# Patient Record
Sex: FEMALE | URBAN - METROPOLITAN AREA
[De-identification: names, ages, dates, MRNs, and addresses within clinical notes are randomized per-mention and may not be internally consistent; named-entity substitution may affect disease eponyms.]

---

## 2017-06-04 ENCOUNTER — EMERGENCY (EMERGENCY)
Facility: HOSPITAL | Age: 57
LOS: 1 days | Discharge: HOME | End: 2017-06-04
Attending: EMERGENCY MEDICINE | Admitting: EMERGENCY MEDICINE
Payer: COMMERCIAL

## 2017-06-04 VITALS
TEMPERATURE: 99 F | SYSTOLIC BLOOD PRESSURE: 134 MMHG | OXYGEN SATURATION: 97 % | HEART RATE: 78 BPM | RESPIRATION RATE: 16 BRPM | DIASTOLIC BLOOD PRESSURE: 82 MMHG

## 2017-06-04 DIAGNOSIS — Z23 ENCOUNTER FOR IMMUNIZATION: ICD-10-CM

## 2017-06-04 DIAGNOSIS — Z79.82 LONG TERM (CURRENT) USE OF ASPIRIN: ICD-10-CM

## 2017-06-04 DIAGNOSIS — Z79.899 OTHER LONG TERM (CURRENT) DRUG THERAPY: ICD-10-CM

## 2017-06-04 DIAGNOSIS — M79.662 PAIN IN LEFT LOWER LEG: ICD-10-CM

## 2017-06-04 DIAGNOSIS — I10 ESSENTIAL (PRIMARY) HYPERTENSION: ICD-10-CM

## 2017-06-04 PROCEDURE — 93971 EXTREMITY STUDY: CPT

## 2017-06-04 PROCEDURE — 99284 EMERGENCY DEPT VISIT MOD MDM: CPT

## 2017-06-04 PROCEDURE — 99284 EMERGENCY DEPT VISIT MOD MDM: CPT | Mod: 25

## 2017-06-04 PROCEDURE — 90715 TDAP VACCINE 7 YRS/> IM: CPT

## 2017-06-04 PROCEDURE — 93971 EXTREMITY STUDY: CPT | Mod: 26

## 2017-06-04 PROCEDURE — 90471 IMMUNIZATION ADMIN: CPT

## 2017-06-04 RX ORDER — TETANUS TOXOID, REDUCED DIPHTHERIA TOXOID AND ACELLULAR PERTUSSIS VACCINE, ADSORBED 5; 2.5; 8; 8; 2.5 [IU]/.5ML; [IU]/.5ML; UG/.5ML; UG/.5ML; UG/.5ML
0.5 SUSPENSION INTRAMUSCULAR ONCE
Qty: 0 | Refills: 0 | Status: COMPLETED | OUTPATIENT
Start: 2017-06-04 | End: 2017-06-04

## 2017-06-04 RX ADMIN — TETANUS TOXOID, REDUCED DIPHTHERIA TOXOID AND ACELLULAR PERTUSSIS VACCINE, ADSORBED 0.5 MILLILITER(S): 5; 2.5; 8; 8; 2.5 SUSPENSION INTRAMUSCULAR at 12:28

## 2017-06-04 NOTE — ED PROVIDER NOTE - NS ED ROS FT
ROS: denies HA, weakness, dizziness, fevers/chills, nausea/vomiting, chest pain, SOB, diaphoresis, abdominal pain, back/neck pain, dysuria/hematuria, or rash  +calf pain

## 2017-06-04 NOTE — ED PROVIDER NOTE - OBJECTIVE STATEMENT
56 y.o. female hx of HTN sp mechanical fall on Thursday seen at urgent care found to have left 5th metatarsal fx and abrasion to right elbow, placed in boot and saw orthopedist on Friday, told that he agreed and told to come to ER if having calf pain, since yesterday now having pain in left calf. States that she has been walking on it and favoring it, states feels like a muscle pull but came to make sure. No knee pain. No swelling or redness. Pain controlled with tylenol at home.

## 2017-06-04 NOTE — ED PROVIDER NOTE - ATTENDING CONTRIBUTION TO CARE
55yo F with L calf pain while walking.  Recent dx of L 5th metatarsal fx placed in boot by ortho.  Pt denies any cp or pleuritic cp or sob.  No fevers.  NAD, completely nontoxic, afebrile, NCAT, no LE edema, no calf ttp, no homans sign, no knee or ankle ttp, mild L 5th metatarsal ttp, strong dp pulses.  LE duplex, tetanus, reassess.

## 2017-06-04 NOTE — ED PROVIDER NOTE - MEDICAL DECISION MAKING DETAILS
56 y.o. female pw left calf pain after 5th metatarsal fx. Compartments soft, compartment syndrome unlikely. Will r/o dvt with US. tdap since not given, likely dc.

## 2017-10-25 RX ORDER — ASPIRIN/CALCIUM CARB/MAGNESIUM 324 MG
1 TABLET ORAL
Qty: 0 | Refills: 0 | COMMUNITY

## 2018-08-28 ENCOUNTER — RESULT REVIEW (OUTPATIENT)
Age: 58
End: 2018-08-28

## 2018-09-24 NOTE — ED PROVIDER NOTE - PHYSICAL EXAMINATION
Gen: Well appearing, NAD, AOx3  Head: NCAT  HEENT:  MMM, normal conjunctiva  Lung: CTAB, no rales, rhonchi or wheezing  CV: S1/S2, no murmurs, rubs or gallops  Abd: soft, NTND, no rebound or guarding  MSK: No CVA tenderness. No edema, no visible deformities. LLE: no swelling or tenderness. No fibular head tenderness or knee tenderness.  Neuro: No focal neurologic deficits. CN II-XII intact. Normal strength and sensation x 4  Skin: Warm and dry, no evidence of rash  Psych: normal mood and affect No

## 2018-12-11 ENCOUNTER — APPOINTMENT (OUTPATIENT)
Dept: OTOLARYNGOLOGY | Facility: CLINIC | Age: 58
End: 2018-12-11

## 2019-09-04 ENCOUNTER — RESULT REVIEW (OUTPATIENT)
Age: 59
End: 2019-09-04

## 2021-07-08 ENCOUNTER — RESULT REVIEW (OUTPATIENT)
Age: 61
End: 2021-07-08

## 2021-09-27 NOTE — ED ADULT NURSE NOTE - CHPI ED SYMPTOMS NEG
Detail Level: Zone
Plan: Courtesy cautery to lesion x 3 on forehead at no additional cost to the patient. RTC for additional treatment if desired
no weakness/no tingling/no difficulty bearing weight/no deformity/no fever/no numbness/no abrasion/no bruising/no stiffness
